# Patient Record
Sex: FEMALE | Race: BLACK OR AFRICAN AMERICAN | Employment: UNEMPLOYED | URBAN - METROPOLITAN AREA
[De-identification: names, ages, dates, MRNs, and addresses within clinical notes are randomized per-mention and may not be internally consistent; named-entity substitution may affect disease eponyms.]

---

## 2017-06-27 ENCOUNTER — OFFICE VISIT (OUTPATIENT)
Dept: SURGERY | Age: 57
End: 2017-06-27

## 2017-06-27 VITALS
WEIGHT: 221.2 LBS | OXYGEN SATURATION: 99 % | DIASTOLIC BLOOD PRESSURE: 92 MMHG | HEIGHT: 66 IN | SYSTOLIC BLOOD PRESSURE: 146 MMHG | BODY MASS INDEX: 35.55 KG/M2 | TEMPERATURE: 97 F | HEART RATE: 80 BPM | RESPIRATION RATE: 18 BRPM

## 2017-06-27 DIAGNOSIS — N95.1 MENOPAUSAL SYNDROME (HOT FLASHES): ICD-10-CM

## 2017-06-27 DIAGNOSIS — N95.9 MENOPAUSAL AND POSTMENOPAUSAL DISORDER: ICD-10-CM

## 2017-06-27 DIAGNOSIS — Z90.722 S/P TAH-BSO (TOTAL ABDOMINAL HYSTERECTOMY AND BILATERAL SALPINGO-OOPHORECTOMY): ICD-10-CM

## 2017-06-27 DIAGNOSIS — Z90.710 S/P TAH-BSO (TOTAL ABDOMINAL HYSTERECTOMY AND BILATERAL SALPINGO-OOPHORECTOMY): ICD-10-CM

## 2017-06-27 DIAGNOSIS — Z01.419 ENCOUNTER FOR ROUTINE GYNECOLOGICAL EXAMINATION WITH PAPANICOLAOU SMEAR OF CERVIX: Primary | ICD-10-CM

## 2017-06-27 DIAGNOSIS — Z90.79 S/P TAH-BSO (TOTAL ABDOMINAL HYSTERECTOMY AND BILATERAL SALPINGO-OOPHORECTOMY): ICD-10-CM

## 2017-06-27 NOTE — MR AVS SNAPSHOT
Visit Information Date & Time Provider Department Dept. Phone Encounter #  
 6/27/2017 11:30 AM Carmen Dewitt, [address] [place] [address] Surgical [address] [id number] Follow-up Instructions Return in about 1 year (around 6/27/2018), or if symptoms worsen or fail to improve. Upcoming Health Maintenance Date Due Hepatitis C Screening 1960 DTaP/Tdap/Td series (1 - Tdap) 3/30/1983 FOBT Q 1 YEAR AGE 50-75 3/30/2012 PAP AKA CERVICAL CYTOLOGY 7/10/2017 BREAST CANCER SCRN MAMMOGRAM 8/15/2017 INFLUENZA AGE 9 TO ADULT 8/1/2017 Allergies as of 6/27/2017  Review Complete On: 6/27/2017 By: Carmen Dewitt MD  
 No Known Allergies Current Immunizations  Never Reviewed No immunizations on file. Not reviewed this visit You Were Diagnosed With   
  
 Codes Comments Encounter for routine gynecological examination with Papanicolaou smear of cervix    -  Primary ICD-10-CM: F99.504 ICD-9-CM: V72.31, V76.2 Menopausal syndrome (hot flashes)     ICD-10-CM: N95.1 ICD-9-CM: 627.2 Menopausal and postmenopausal disorder     ICD-10-CM: N95.9 ICD-9-CM: 627.9 S/P RIDDHI-BSO (total abdominal hysterectomy and bilateral salpingo-oophorectomy)     ICD-10-CM: Z90.710, Z90.722, Z90.79 ICD-9-CM: V88.01, V45.77 Vitals BP Pulse Temp Resp Height(growth percentile) Weight(growth percentile) (!) 146/92 80 97 °F (36.1 °C) (Oral) 18 5' 6\" (1.676 m) 221 lb 3.2 oz (100.3 kg) SpO2 BMI OB Status Smoking Status 99% 35.7 kg/m2 Postmenopausal Former Smoker Vitals History BMI and BSA Data Body Mass Index Body Surface Area 35.7 kg/m 2 2.16 m 2 Preferred Pharmacy Pharmacy Name Phone [facility] [address]  [place] [phone] Your Updated Medication List  
  
   
This list is accurate as of: 6/27/17 12:30 PM.  Always use your most recent med list.  
 ADVIL PM PO Take  by mouth as needed. albuterol 90 mcg/actuation inhaler Commonly known as:  PROVENTIL HFA, VENTOLIN HFA, PROAIR HFA Take 2 Puffs by inhalation every four (4) hours as needed for Wheezing. conj estrogens-bazedoxifene 0.45-20 mg Tab Commonly known as:  Vesta Sox Take 0.45 mg by mouth daily. HYDROcodone-homatropine 5-1.5 mg/5 mL (5 mL) syrup Commonly known as:  HYCODAN Take 5 mL by mouth four (4) times daily. Max Daily Amount: 20 mL. TYLENOL PM PO Take  by mouth. We Performed the Following PAP, LB, RFX HPV RDCUB([id number]) [id number] CPT(R)] Follow-up Instructions Return in about 1 year (around 6/27/2018), or if symptoms worsen or fail to improve. To-Do List   
 06/27/2017 Imaging:  [facility] BI SCREENING INCL CAD Introducing [facility]! Yessenia Valentin introduces [employer] patient portal. Now you can access parts of your medical record, email your doctor's office, and request medication refills online. 1. In your internet browser, go to [email or web address] 2. Click on the First Time User? Click Here link in the Sign In box. You will see the New Member Sign Up page. 3. Enter your [employer] Access Code exactly as it appears below. You will not need to use this code after youve completed the sign-up process. If you do not sign up before the expiration date, you must request a new code. · [employer] Access Code: [email or web address] Expires: 9/25/2017 12:30 PM 
 
4. Enter the last four digits of your Social Security Number (xxxx) and Date of Birth (mm/dd/yyyy) as indicated and click Submit. You will be taken to the next sign-up page. 5. Create a [employer] ID. This will be your [employer] login ID and cannot be changed, so think of one that is secure and easy to remember. 6. Create a [employer] password. You can change your password at any time. 7. Enter your Password Reset Question and Answer. This can be used at a later time if you forget your password. 8. Enter your e-mail address. You will receive e-mail notification when new information is available in [address] 9. Click Sign Up. You can now view and download portions of your medical record. 10. Click the Download Summary menu link to download a portable copy of your medical information. If you have questions, please visit the Frequently Asked Questions section of the [employer] website. Remember, [employer] is NOT to be used for urgent needs. For medical emergencies, dial 911. Now available from your iPhone and Android! Please provide this summary of care documentation to your next provider. Your primary care clinician is listed as Alexa Fontenot. If you have any questions after today's visit, please call [phone].

## 2017-06-27 NOTE — PROGRESS NOTES
dSUBJECTIVE: Leandro Llanos is a 56 y.o. female who presents with desire for annual well woman exam. No LMP recorded. Patient is postmenopausal.    No Known Allergies      History reviewed. No pertinent past medical history. Past Surgical History:   Procedure Laterality Date    HX GYN      RIDDHI BSO for fibroids    HX HERNIA REPAIR      right inguinal at age 5   [de-identified] ORTHOPAEDIC      right foot for plantar fasciitis       OB History    Grav Para Term  Abortions TAB SAB Ect Mult Living    2 1 1 0 1 0 0 1 0 1        Family History   Problem Relation Age of Onset    Alcohol abuse Father     Elevated Lipids Sister     Diabetes Sister     Hypertension Sister      Social History     Social History    Marital status:      Spouse name: N/A    Number of children: N/A    Years of education: N/A     Occupational History    Not on file. Social History Main Topics    Smoking status: Former Smoker    Smokeless tobacco: Never Used    Alcohol use No    Drug use: No    Sexual activity: Yes     Partners: Male     Birth control/ protection: Surgical     Other Topics Concern    Not on file     Social History Narrative     Current Outpatient Prescriptions   Medication Sig Dispense Refill    IBUPROFEN/DIPHENHYDRAMINE CIT (ADVIL PM PO) Take  by mouth as needed.  ACETAMINOPHEN/DIPHENHYDRAMINE (TYLENOL PM PO) Take  by mouth.  albuterol (PROVENTIL HFA, VENTOLIN HFA, PROAIR HFA) 90 mcg/actuation inhaler Take 2 Puffs by inhalation every four (4) hours as needed for Wheezing. 1 Inhaler 1    HYDROcodone-homatropine (HYCODAN) 5-1.5 mg/5 mL (5 mL) syrup Take 5 mL by mouth four (4) times daily. Max Daily Amount: 20 mL. 120 mL 0    conj estrogens-bazedoxifene (DUAVEE) 0.45-20 mg tab Take 0.45 mg by mouth daily. 30 Tab 12       Review of Systems:   Constitutional: No weight change, chills or fever, anorexia, weakness or sleep disturbance .  Cardiovascular: No chest pain, shortness of breath, or palpitations . Respiratory: No cough, shortness of breath, hemoptysis, or orthopnea . Neurologic: No syncope, headaches or seizures . Hematologic: No easy bruising or unusual bleeding . Psychiatric: No insomnia, confusion, depression, or anxiety . GI:No nausea and vomiting, diarrhea or constipation  . : See HPI . Musculoskeletal: No joint pain or muscle pain . Endocrine: No polydipsia, polyuria, cold intolerance, excessive fatigue, or sleep disturbance . Integumentary: No breast pain, lumps, nipple discharge, or axillary lumps . Objective:     Visit Vitals    BP (!) 146/92    Pulse 80    Temp 97 °F (36.1 °C) (Oral)    Resp 18    Ht 5' 6\" (1.676 m)    Wt 221 lb 3.2 oz (100.3 kg)    SpO2 99%    BMI 35.7 kg/m2       General:  alert, cooperative, no distress, appears stated age   Skin:  no rash or abnormalities   Eyes: negative   Mouth: MMM no lesions   Lymph Nodes:  Cervical, supraclavicular, and axillary nodes normal.   Breast Exam: normal appearance, no masses or tenderness    Lungs:  clear to auscultation bilaterally   Heart:  regular rate and rhythm   Abdomen: soft, non-tender. Bowel sounds normal. No masses,  no organomegaly   Back:  Costovertebral angle tenderness absent   Genitourinary: Pelvic exam: VULVA: normal appearing vulva with no masses, tenderness or lesions, VAGINA: atrophic, CERVIX: surgically absent, UTERUS: surgically absent, vaginal cuff well healed, ADNEXA: surgically absent bilateral    Extremities:  extremities normal, atraumatic, no cyanosis or edema   Neurologic:  negative   Psychiatric:  non focal     ASSESSMENT:      ICD-10-CM ICD-9-CM    1. Encounter for routine gynecological examination with Papanicolaou smear of cervix Z01.419 V72.31 NIGEL MAMMO BI SCREENING INCL CAD     V76.2 PAP, LB, RFX HPV YJPOH([id number])   2. Menopausal syndrome (hot flashes) N95.1 627.2    3. Menopausal and postmenopausal disorder N95.9 627.9    4.  S/P RIDDHI-BSO (total abdominal hysterectomy and bilateral salpingo-oophorectomy) Z90.710 V88.01     Z90.722 V45.77     Z90.79          Follow-up Disposition:  Return in about 1 year (around 6/27/2018), or if symptoms worsen or fail to improve.

## 2017-06-30 LAB
CYTOLOGIST CVX/VAG CYTO: NORMAL
DX ICD CODE: NORMAL
LABCORP, 190119: NORMAL
Lab: NORMAL
Lab: NORMAL
OTHER STN SPEC: NORMAL
PATH REPORT.FINAL DX SPEC: NORMAL
STAT OF ADQ CVX/VAG CYTO-IMP: NORMAL

## 2022-03-19 PROBLEM — Z90.722 S/P TAH-BSO (TOTAL ABDOMINAL HYSTERECTOMY AND BILATERAL SALPINGO-OOPHORECTOMY): Status: ACTIVE | Noted: 2017-06-27

## 2022-03-19 PROBLEM — Z90.79 S/P TAH-BSO (TOTAL ABDOMINAL HYSTERECTOMY AND BILATERAL SALPINGO-OOPHORECTOMY): Status: ACTIVE | Noted: 2017-06-27

## 2022-03-19 PROBLEM — Z90.710 S/P TAH-BSO (TOTAL ABDOMINAL HYSTERECTOMY AND BILATERAL SALPINGO-OOPHORECTOMY): Status: ACTIVE | Noted: 2017-06-27

## 2023-11-15 ENCOUNTER — HOSPITAL ENCOUNTER (OUTPATIENT)
Facility: HOSPITAL | Age: 63
Discharge: HOME | End: 2023-11-18
Attending: ORTHOPAEDIC SURGERY
Payer: COMMERCIAL

## 2023-11-15 DIAGNOSIS — R26.81 UNSTEADINESS ON FEET: ICD-10-CM

## 2023-11-15 DIAGNOSIS — M54.16 LUMBAR RADICULOPATHY: ICD-10-CM

## 2023-11-15 PROCEDURE — 72148 MRI LUMBAR SPINE W/O DYE: CPT

## 2023-12-25 ENCOUNTER — HOSPITAL ENCOUNTER (EMERGENCY)
Facility: HOSPITAL | Age: 63
Discharge: HOME | End: 2023-12-26
Attending: EMERGENCY MEDICINE
Payer: MEDICAID

## 2023-12-25 VITALS
HEIGHT: 66 IN | WEIGHT: 280 LBS | BODY MASS INDEX: 45 KG/M2 | HEART RATE: 99 BPM | OXYGEN SATURATION: 95 % | RESPIRATION RATE: 18 BRPM | DIASTOLIC BLOOD PRESSURE: 112 MMHG | TEMPERATURE: 98.1 F | SYSTOLIC BLOOD PRESSURE: 158 MMHG

## 2023-12-25 DIAGNOSIS — R10.9 ABDOMINAL PAIN, UNSPECIFIED ABDOMINAL LOCATION: Primary | ICD-10-CM

## 2023-12-25 PROCEDURE — 80053 COMPREHEN METABOLIC PANEL: CPT

## 2023-12-25 PROCEDURE — 99285 EMERGENCY DEPT VISIT HI MDM: CPT

## 2023-12-25 PROCEDURE — 36415 COLL VENOUS BLD VENIPUNCTURE: CPT

## 2023-12-25 PROCEDURE — 6360000002 HC RX W HCPCS: Performed by: EMERGENCY MEDICINE

## 2023-12-25 PROCEDURE — 96375 TX/PRO/DX INJ NEW DRUG ADDON: CPT

## 2023-12-25 PROCEDURE — 2580000003 HC RX 258: Performed by: EMERGENCY MEDICINE

## 2023-12-25 PROCEDURE — 85025 COMPLETE CBC W/AUTO DIFF WBC: CPT

## 2023-12-25 PROCEDURE — 96374 THER/PROPH/DIAG INJ IV PUSH: CPT

## 2023-12-25 PROCEDURE — 83690 ASSAY OF LIPASE: CPT

## 2023-12-25 RX ORDER — ONDANSETRON 2 MG/ML
4 INJECTION INTRAMUSCULAR; INTRAVENOUS EVERY 6 HOURS PRN
Status: DISCONTINUED | OUTPATIENT
Start: 2023-12-25 | End: 2023-12-26 | Stop reason: HOSPADM

## 2023-12-25 RX ORDER — 0.9 % SODIUM CHLORIDE 0.9 %
1000 INTRAVENOUS SOLUTION INTRAVENOUS ONCE
Status: COMPLETED | OUTPATIENT
Start: 2023-12-25 | End: 2023-12-26

## 2023-12-25 RX ORDER — MORPHINE SULFATE 4 MG/ML
4 INJECTION, SOLUTION INTRAMUSCULAR; INTRAVENOUS ONCE
Status: COMPLETED | OUTPATIENT
Start: 2023-12-25 | End: 2023-12-25

## 2023-12-25 RX ADMIN — MORPHINE SULFATE 4 MG: 4 INJECTION, SOLUTION INTRAMUSCULAR; INTRAVENOUS at 23:54

## 2023-12-25 RX ADMIN — ONDANSETRON 4 MG: 2 INJECTION INTRAMUSCULAR; INTRAVENOUS at 23:54

## 2023-12-25 RX ADMIN — SODIUM CHLORIDE 1000 ML: 9 INJECTION, SOLUTION INTRAVENOUS at 23:53

## 2023-12-26 ENCOUNTER — APPOINTMENT (OUTPATIENT)
Facility: HOSPITAL | Age: 63
End: 2023-12-26
Payer: MEDICAID

## 2023-12-26 LAB
ALBUMIN SERPL-MCNC: 3.7 G/DL (ref 3.5–5)
ALBUMIN/GLOB SERPL: 1 (ref 1.1–2.2)
ALP SERPL-CCNC: 119 U/L (ref 45–117)
ALT SERPL-CCNC: 20 U/L (ref 12–78)
ANION GAP SERPL CALC-SCNC: 3 MMOL/L (ref 5–15)
AST SERPL-CCNC: 17 U/L (ref 15–37)
BASOPHILS # BLD: 0 K/UL (ref 0–0.1)
BASOPHILS NFR BLD: 1 % (ref 0–1)
BILIRUB SERPL-MCNC: 0.4 MG/DL (ref 0.2–1)
BUN SERPL-MCNC: 16 MG/DL (ref 6–20)
BUN/CREAT SERPL: 17 (ref 12–20)
CALCIUM SERPL-MCNC: 8.8 MG/DL (ref 8.5–10.1)
CHLORIDE SERPL-SCNC: 107 MMOL/L (ref 97–108)
CO2 SERPL-SCNC: 30 MMOL/L (ref 21–32)
CREAT SERPL-MCNC: 0.94 MG/DL (ref 0.55–1.02)
DIFFERENTIAL METHOD BLD: NORMAL
EOSINOPHIL # BLD: 0.1 K/UL (ref 0–0.4)
EOSINOPHIL NFR BLD: 1 % (ref 0–7)
ERYTHROCYTE [DISTWIDTH] IN BLOOD BY AUTOMATED COUNT: 13.3 % (ref 11.5–14.5)
GLOBULIN SER CALC-MCNC: 3.7 G/DL (ref 2–4)
GLUCOSE SERPL-MCNC: 109 MG/DL (ref 65–100)
HCT VFR BLD AUTO: 41.4 % (ref 35–47)
HGB BLD-MCNC: 13.1 G/DL (ref 11.5–16)
IMM GRANULOCYTES # BLD AUTO: 0 K/UL (ref 0–0.04)
IMM GRANULOCYTES NFR BLD AUTO: 0 % (ref 0–0.5)
LIPASE SERPL-CCNC: 113 U/L (ref 13–75)
LYMPHOCYTES # BLD: 2.1 K/UL (ref 0.8–3.5)
LYMPHOCYTES NFR BLD: 39 % (ref 12–49)
MCH RBC QN AUTO: 30.5 PG (ref 26–34)
MCHC RBC AUTO-ENTMCNC: 31.6 G/DL (ref 30–36.5)
MCV RBC AUTO: 96.3 FL (ref 80–99)
MONOCYTES # BLD: 0.5 K/UL (ref 0–1)
MONOCYTES NFR BLD: 9 % (ref 5–13)
NEUTS SEG # BLD: 2.6 K/UL (ref 1.8–8)
NEUTS SEG NFR BLD: 50 % (ref 32–75)
NRBC # BLD: 0 K/UL (ref 0–0.01)
NRBC BLD-RTO: 0 PER 100 WBC
PLATELET # BLD AUTO: 212 K/UL (ref 150–400)
PMV BLD AUTO: 11.1 FL (ref 8.9–12.9)
POTASSIUM SERPL-SCNC: 3.6 MMOL/L (ref 3.5–5.1)
PROT SERPL-MCNC: 7.4 G/DL (ref 6.4–8.2)
RBC # BLD AUTO: 4.3 M/UL (ref 3.8–5.2)
SODIUM SERPL-SCNC: 140 MMOL/L (ref 136–145)
WBC # BLD AUTO: 5.3 K/UL (ref 3.6–11)

## 2023-12-26 PROCEDURE — 6360000004 HC RX CONTRAST MEDICATION: Performed by: EMERGENCY MEDICINE

## 2023-12-26 PROCEDURE — 74177 CT ABD & PELVIS W/CONTRAST: CPT

## 2023-12-26 RX ORDER — ONDANSETRON 8 MG/1
8 TABLET, ORALLY DISINTEGRATING ORAL 3 TIMES DAILY PRN
Qty: 12 TABLET | Refills: 0 | Status: SHIPPED | OUTPATIENT
Start: 2023-12-26

## 2023-12-26 RX ORDER — ONDANSETRON 8 MG/1
8 TABLET, ORALLY DISINTEGRATING ORAL 3 TIMES DAILY PRN
Qty: 12 TABLET | Refills: 0 | Status: SHIPPED | OUTPATIENT
Start: 2023-12-26 | End: 2023-12-26

## 2023-12-26 RX ORDER — DICYCLOMINE HCL 20 MG
20 TABLET ORAL 3 TIMES DAILY PRN
Qty: 24 TABLET | Refills: 0 | Status: SHIPPED | OUTPATIENT
Start: 2023-12-26 | End: 2023-12-26

## 2023-12-26 RX ORDER — DICYCLOMINE HCL 20 MG
20 TABLET ORAL 3 TIMES DAILY PRN
Qty: 24 TABLET | Refills: 0 | Status: SHIPPED | OUTPATIENT
Start: 2023-12-26

## 2023-12-26 RX ADMIN — IOPAMIDOL 100 ML: 755 INJECTION, SOLUTION INTRAVENOUS at 01:29

## 2023-12-26 NOTE — ED PROVIDER NOTES
[facility] EMERGENCY DEPT  EMERGENCY DEPARTMENT ENCOUNTER       Pt Name: Kylah Matias  MRN: [id number]  [address] [facility] 1960  Date of evaluation: 12/25/2023  Provider: Mario Oro DO   PCP: Diane Gavin MD  Note Started: 11:26 PM EST 12/25/23     CHIEF COMPLAINT       Chief Complaint   Patient presents with    Abdominal Pain     Started around 10:15pm. +N/V        HISTORY OF PRESENT ILLNESS: 1 or more elements      History From: Patient, History limited by: none     Kylah Matias is a 66 y.o. female cc of abdominal pain. Symptoms started suddenly, diffuse abdominal pain, rated the rectum. Associated with nausea and 1 episode of vomiting. Pain is improved now. Nothing makes symptoms better or worse       Please See MDM for Additional Details of the HPI/PMH  Nursing Notes were all reviewed and agreed with or any disagreements were addressed in the HPI. REVIEW OF SYSTEMS        Positives and Pertinent negatives as per HPI. PAST HISTORY     Past Medical History:  No past medical history on file. Past Surgical History:  No past surgical history on file. Family History:  No family history on file. Social History:  Social History     Tobacco Use    Smoking status: Never    Smokeless tobacco: Never   Substance Use Topics    Alcohol use: Never    Drug use: Never       Allergies: Allergies   Allergen Reactions    Pcn [Penicillins] Rash       CURRENT MEDICATIONS      Previous Medications    ALBUTEROL SULFATE HFA (PROVENTIL;VENTOLIN;PROAIR) 108 (90 BASE) MCG/ACT INHALER    Inhale 2 puffs into the lungs every 4 hours as needed    CONJ ESTROGENS-BAZEDOXIFENE 0.45-20 MG TABS    Take 0.45 mg by mouth daily    DICLOFENAC (VOLTAREN) 75 MG EC TABLET    Take 1 tablet by mouth 2 times daily    DIPHENHYDRAMINE-APAP, SLEEP, (TYLENOL PM EXTRA STRENGTH)  MG TABLET    Take by mouth    GABAPENTIN (NEURONTIN) 300 MG CAPSULE    Take 1 capsule by mouth nightly as needed (nerve pain) for up to 30 days.  Max Daily Amount: 300 mg

## 2024-04-02 ENCOUNTER — HOSPITAL ENCOUNTER (OUTPATIENT)
Facility: HOSPITAL | Age: 64
Discharge: HOME | End: 2024-04-05
Payer: MEDICARE

## 2024-04-02 ENCOUNTER — TRANSCRIBE ORDERS (OUTPATIENT)
Facility: HOSPITAL | Age: 64
End: 2024-04-02

## 2024-04-02 VITALS — HEIGHT: 67 IN | BODY MASS INDEX: 36.89 KG/M2 | WEIGHT: 235.01 LBS

## 2024-04-02 DIAGNOSIS — Z12.31 VISIT FOR SCREENING MAMMOGRAM: Primary | ICD-10-CM

## 2024-04-02 DIAGNOSIS — Z12.31 VISIT FOR SCREENING MAMMOGRAM: ICD-10-CM

## 2024-04-02 PROCEDURE — 77067 SCR MAMMO BI INCL CAD: CPT

## 2025-02-11 ENCOUNTER — TRANSCRIBE ORDERS (OUTPATIENT)
Facility: HOSPITAL | Age: 65
End: 2025-02-11

## 2025-02-11 ENCOUNTER — HOSPITAL ENCOUNTER (OUTPATIENT)
Facility: HOSPITAL | Age: 65
Discharge: HOME | End: 2025-02-14
Payer: MEDICARE

## 2025-02-11 DIAGNOSIS — Z12.31 VISIT FOR SCREENING MAMMOGRAM: ICD-10-CM

## 2025-02-11 DIAGNOSIS — Z12.31 VISIT FOR SCREENING MAMMOGRAM: Primary | ICD-10-CM

## 2025-02-11 PROCEDURE — 77067 SCR MAMMO BI INCL CAD: CPT
